# Patient Record
Sex: MALE | URBAN - METROPOLITAN AREA
[De-identification: names, ages, dates, MRNs, and addresses within clinical notes are randomized per-mention and may not be internally consistent; named-entity substitution may affect disease eponyms.]

---

## 2024-07-30 ENCOUNTER — PRE VISIT (OUTPATIENT)
Dept: UROLOGY | Facility: CLINIC | Age: 54
End: 2024-07-30
Payer: COMMERCIAL

## 2024-07-30 NOTE — TELEPHONE ENCOUNTER
Reason for visit: Male circumcision evaluation    Records/imaging/labs/orders: no records available    Pt called: no need for a call    At Rooming:  Standard rooming      Edinson Randolph  7/30/2024  11:25 AM

## 2024-08-09 NOTE — TELEPHONE ENCOUNTER
MEDICAL RECORDS REQUEST   [facility]  Urology Clinic  [address] [place] [address]  PHONE: [phone]  Fax: [phone]        FUTURE VISIT INFORMATION                                                   Tsering Morales : 1970 scheduled for future visit at [facility]    APPOINTMENT INFORMATION:  Date: 2024  Provider:  Brenden Albarran MD  Reason for Visit/Diagnosis: Adult Male Circumcision      RECORDS REQUESTED FOR VISIT                                                     NOTES  STATUS/DETAILS   OFFICE NOTE from other specialist  yes   MEDICATION LIST  yes     PRE-VISIT CHECKLIST      Joint diagnostic appointment coordinated correctly          (ensure right order & amount of time) Yes   RECORD COLLECTION COMPLETE Yes

## 2024-09-05 ENCOUNTER — OFFICE VISIT (OUTPATIENT)
Dept: UROLOGY | Facility: CLINIC | Age: 54
End: 2024-09-05
Payer: COMMERCIAL

## 2024-09-05 ENCOUNTER — PRE VISIT (OUTPATIENT)
Dept: UROLOGY | Facility: CLINIC | Age: 54
End: 2024-09-05

## 2024-09-05 VITALS
HEIGHT: 76 IN | DIASTOLIC BLOOD PRESSURE: 84 MMHG | HEART RATE: 60 BPM | SYSTOLIC BLOOD PRESSURE: 134 MMHG | BODY MASS INDEX: 25.57 KG/M2 | WEIGHT: 210 LBS

## 2024-09-05 DIAGNOSIS — N48.0 BXO (BALANITIS XEROTICA OBLITERANS): Primary | ICD-10-CM

## 2024-09-05 PROCEDURE — 99203 OFFICE O/P NEW LOW 30 MIN: CPT | Performed by: UROLOGY

## 2024-09-05 RX ORDER — SILDENAFIL 50 MG/1
TABLET, FILM COATED ORAL
COMMUNITY
Start: 2024-03-03

## 2024-09-05 RX ORDER — TADALAFIL 5 MG/1
TABLET ORAL
COMMUNITY
Start: 2024-08-23

## 2024-09-05 RX ORDER — CLOBETASOL PROPIONATE 0.5 MG/G
CREAM TOPICAL
COMMUNITY

## 2024-09-05 RX ORDER — VALACYCLOVIR HYDROCHLORIDE 500 MG/1
TABLET, FILM COATED ORAL
COMMUNITY

## 2024-09-05 RX ORDER — BETAMETHASONE DIPROPIONATE 0.5 MG/G
CREAM TOPICAL 2 TIMES DAILY
Qty: 15 G | Refills: 1 | Status: SHIPPED | OUTPATIENT
Start: 2024-09-05

## 2024-09-05 ASSESSMENT — PAIN SCALES - GENERAL: PAINLEVEL: NO PAIN (0)

## 2024-09-05 NOTE — PATIENT INSTRUCTIONS
Can you please schedule a circumcision discussion with  at your earliest convenience?  Thank you!  Edinson Randolph  It was a pleasure meeting with you today.  Thank you for allowing me and my team the privilege of caring for you today.  YOU are the reason we are here, and I truly hope we provided you with the excellent service you deserve.  Please let us know if there is anything else we can do for you so that we can be sure you are leaving completely satisfied with your care experience.

## 2024-09-05 NOTE — NURSING NOTE
"Chief Complaint   Patient presents with    Consult For     Circumcision            Blood pressure 134/84, pulse 60, height 1.93 m (6' 4\"), weight 95.3 kg (210 lb). Body mass index is 25.56 kg/m .    There is no problem list on file for this patient.      No Known Allergies    Current Outpatient Medications   Medication Sig Dispense Refill    sildenafil (VIAGRA) 50 MG tablet TAKE 1 TABLET BY MOUTH ONCE DAILY AS NEEDED ONE HOUR BEFORE SEX, NOT MORE THAN ONCE PER DAY      tadalafil (CIALIS) 5 MG tablet TAKE ONE TAB BY MOUTH ONCE DAILY FOR ERECTILE DYSFUNCTION,      clobetasol propionate (TEMOVATE) 0.05 % external cream PLEASE SEE ATTACHED FOR DETAILED DIRECTIONS      valACYclovir (VALTREX) 500 MG tablet TAKE 1 TABLET BY MOUTH DAILY AS NEEDED FOR SYMPTOMS.         Social History     Tobacco Use    Smoking status: Never     Passive exposure: Never    Smokeless tobacco: Never       Laurence Huber  9/5/2024  11:40 AM     "

## 2024-09-05 NOTE — LETTER
"9/5/2024       RE: Tsering Morales  [address] [facility] [address]     Dear Colleague,    Thank you for referring your patient, Tsering Morales, to the [facility] [place] at [facility]. Please see a copy of my visit note below.    HPI:  Mr. 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 New patient, self referred.       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  He previously saw Dr. Zelaya in urology a year ago July 2023.  I reviewed that clinic note.  He was prescribed clobetasol cream.      Reviewed outside facility urology note Dr. Zelaya.    Follow-up Dr. Galarza for discussion of more complex circ/ reconstruction.    Exam:  /84   Pulse 60   Ht 1.93 m (6' 4\")   Wt 95.3 kg (210 lb)   BMI 25.56 kg/m      General: Alert, oriented, nad  Eyes: anicteric, EOMI.  Pulse: regular  Resps: normal, non-labored.  Abdomen:  nondistended.   exam: Noncircumcised.  No appreciable phimosis.  He does have some areas of adhesions of the inner prep use to the coronal ridge.  These are mild to moderate width and are relatively thin.  The majority of the glans is spared.  There is BXO at the coronal sulcus, especially dorsally.  There may be a few ventral lesions coming around his right side and on the left dorsum coronal ridge.  Much of this adhesion appears like a small tunnel that could be released, but would require sharp excision.  There is chronic inflammation of the coronal sulcus, with typical BXO appearance.    Review of Imaging:  N/A    Review of Labs:  PSA normal 1.1 2/6/2023    Assessment & Plan  BXO, associated with coronal adhesions.  Discussed that BXO can be considered a premalignant finding.  Topical steroid cream can be used, but surgical excision is more definitive.  I renewed his prescription for betamethasone Cream 0.05%.  Advised twice daily use, up to 4 " weeks only.  Advised longer use than this may cause some degree of permanent skin thinning.  Follow-up with Dr. Lee here for discussion of surgical options, possible reconstruction.  Discussed that I could do a circumcision but if we were to remove all the diseased skin he would lose the coronal ridge and have an atypical appearance of the glans.  Consult to reconstructive urologist to see if he has any other surgical options, maybe a skin graft?  Steroid cream would likely calm this down, but it is less likely to affect a cure.  I'm happy to see him back in the future as needed.     Brenden Albarran MD  [facility] [place]    ==========================      Additional Coding Information:    Problems:  3 -- one stable chronic illness    Data Reviewed  Reviewed urology note [facility] July 2023 Dr. Zelaya    Level of risk:  4 -- prescription drug management    Time spent:  12 minutes spent by me on the date of the encounter doing chart review, history and exam, documentation and further activities per the note          Again, thank you for allowing me to participate in the care of your patient.      Sincerely,    Brenden Albarran MD

## 2024-09-05 NOTE — PROGRESS NOTES
"HPI:  Mr. 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 New patient, self referred.       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  He previously saw Dr. Zelaya in urology a year ago July 2023.  I reviewed that clinic note.  He was prescribed clobetasol cream.      Reviewed outside facility urology note Dr. Zelaya.    Follow-up Dr. Galarza for discussion of more complex circ/ reconstruction.    Exam:  /84   Pulse 60   Ht 1.93 m (6' 4\")   Wt 95.3 kg (210 lb)   BMI 25.56 kg/m      General: Alert, oriented, nad  Eyes: anicteric, EOMI.  Pulse: regular  Resps: normal, non-labored.  Abdomen:  nondistended.   exam: Noncircumcised.  No appreciable phimosis.  He does have some areas of adhesions of the inner prep use to the coronal ridge.  These are mild to moderate width and are relatively thin.  The majority of the glans is spared.  There is BXO at the coronal sulcus, especially dorsally.  There may be a few ventral lesions coming around his right side and on the left dorsum coronal ridge.  Much of this adhesion appears like a small tunnel that could be released, but would require sharp excision.  There is chronic inflammation of the coronal sulcus, with typical BXO appearance.    Review of Imaging:  N/A    Review of Labs:  PSA normal 1.1 2/6/2023    Assessment & Plan   BXO, associated with coronal adhesions.  Discussed that BXO can be considered a premalignant finding.  Topical steroid cream can be used, but surgical excision is more definitive.  I renewed his prescription for betamethasone Cream 0.05%.  Advised twice daily use, up to 4 weeks only.  Advised longer use than this may cause some degree of permanent skin thinning.  Follow-up with Dr. Lee here for discussion of surgical options, possible reconstruction.  Discussed that I could do a circumcision but if we were to remove all the diseased skin he would lose the coronal ridge and have " an atypical appearance of the glans.  Consult to reconstructive urologist to see if he has any other surgical options, maybe a skin graft?  Steroid cream would likely calm this down, but it is less likely to affect a cure.  I'm happy to see him back in the future as needed.     Brenden Albarran MD  [facility] UROLOGY [facility] [place]    ==========================      Additional Coding Information:    Problems:  3 -- one stable chronic illness    Data Reviewed  Reviewed urology note [facility] July 2023 Dr. Zelaya    Level of risk:  4 -- prescription drug management    Time spent:  12 minutes spent by me on the date of the encounter doing chart review, history and exam, documentation and further activities per the note

## 2024-10-23 ENCOUNTER — PRE VISIT (OUTPATIENT)
Dept: UROLOGY | Facility: CLINIC | Age: 54
End: 2024-10-23
Payer: COMMERCIAL

## 2024-10-23 NOTE — TELEPHONE ENCOUNTER
"Reason for visit: \"Consult to reconstructive urologist to see if he has any other surgical options, maybe a skin graft\" per Dr Albarran      Dx/Hx/Sx: possible circumcision     Records/imaging/labs/orders: in Psychiatric     At Rooming: standard   "

## 2024-11-03 ENCOUNTER — HEALTH MAINTENANCE LETTER (OUTPATIENT)
Age: 54
End: 2024-11-03